# Patient Record
Sex: FEMALE | Race: ASIAN | NOT HISPANIC OR LATINO
[De-identification: names, ages, dates, MRNs, and addresses within clinical notes are randomized per-mention and may not be internally consistent; named-entity substitution may affect disease eponyms.]

---

## 2023-08-22 PROBLEM — Z00.00 ENCOUNTER FOR PREVENTIVE HEALTH EXAMINATION: Status: ACTIVE | Noted: 2023-08-22

## 2023-08-23 ENCOUNTER — APPOINTMENT (OUTPATIENT)
Dept: PAIN MANAGEMENT | Facility: CLINIC | Age: 63
End: 2023-08-23
Payer: MEDICAID

## 2023-08-23 VITALS — WEIGHT: 160 LBS | BODY MASS INDEX: 28.35 KG/M2 | HEIGHT: 63 IN

## 2023-08-23 VITALS — SYSTOLIC BLOOD PRESSURE: 124 MMHG | DIASTOLIC BLOOD PRESSURE: 83 MMHG | HEART RATE: 77 BPM

## 2023-08-23 DIAGNOSIS — Z87.39 PERSONAL HISTORY OF OTHER DISEASES OF THE MUSCULOSKELETAL SYSTEM AND CONNECTIVE TISSUE: ICD-10-CM

## 2023-08-23 DIAGNOSIS — M25.511 PAIN IN RIGHT SHOULDER: ICD-10-CM

## 2023-08-23 DIAGNOSIS — M54.16 RADICULOPATHY, LUMBAR REGION: ICD-10-CM

## 2023-08-23 PROCEDURE — 99204 OFFICE O/P NEW MOD 45 MIN: CPT

## 2023-08-23 RX ORDER — GABAPENTIN 300 MG/1
300 CAPSULE ORAL 3 TIMES DAILY
Qty: 90 | Refills: 1 | Status: ACTIVE | COMMUNITY
Start: 2023-08-23 | End: 1900-01-01

## 2023-08-23 RX ORDER — IBUPROFEN 400 MG/1
400 TABLET, FILM COATED ORAL
Refills: 0 | Status: ACTIVE | COMMUNITY

## 2023-08-23 NOTE — HISTORY OF PRESENT ILLNESS
[FreeTextEntry1] : This is a 62-year-old female here to establish care for lower back pain with radicular features into the lower extremities. She has secondary pain in the right shoulder that occasionally travels down the right arm. There is also pain with ROM. Her back pain has been ongoing for several years and during a flare up, the pain is severe and makes it difficult for her to ambulate or perform her ALDs. She recently experienced two flare ups of pain. She notes that in 2016 she underwent injection intervention, with a pain management provider in [facility], which provided her with moderate relief. There is no recent imaging for review today and she not recently trialed a physical therapy program. Previous therapy sessions provided her with no relief of her symptoms. She is interested in repeating injections but is aware that imaging is needed for review in order to move forward.   The patient has had this pain for 6 years worsening within the last 10 days. The pain started after no event patient describes pain as moderate to severe and intermittent. During the last month the pain has been worse during the night. Pain described as sharp. Pain is increased with lying down, standing, walking, exercise. Pain is not changed with sitting, relaxation, coughing/wheezing, bowel movements. Bowel or bladder habits have not changed.  ACTIVITIES: Patient could walk 3-4 blocks before the pain starts. Patient can sit 3 hours before pain starts. Patient can stand 2-hour before pain starts. The patient often lays down because of pain. Patient uses no assistive walking at this time. Patient has difficulty exercise at this time.  PRIOR PAIN TREATMENTS: Moderate relief with heat treatment. No relief with physical therapy.  PRIOR PAIN MEDICATIONS: Motrin.

## 2023-08-23 NOTE — ASSESSMENT
[FreeTextEntry1] : This is a 62-year-old female with complaints of low back pain with radicular features into the right lower extremity.  She has secondary pain in the right shoulder that occasionally travels down into the right arm.  Patient previously trialed a physical therapy program for her lumbar radicular pain which provided her no relief.  Previous injection treatments proved successful and she is interested in repeating them.  We will obtain a MRI of the lumbar spine and an x-ray of the right shoulder for further review.  In the interim she is advised to be in a physical therapy program for the right shoulder pain. She will follow up in 3 weeks to review imaging and reassessment. All this patients questions were answered and the conversation was understood well.  Lumbar MRI was ordered to delineate spine pathology such as disc displacement and stenosis.  Will order aright shoulder 2 view x-ray due to pain and decrease in range of motion in that area to delineate a pain generator.  Physical therapy of the right shoulder 2-3 times a week for 4-8 weeks stressing a home exercise program of walking, shoulder griddle strengthening, swimming, elliptical , recumbent bike, Virgil chi and Yoga. Use things that heat like hot shower or icy heat before rehab, exercising and at the beginning of the day, and ice (ice in a bag never directly on the skin) after activity and at the end of the day.  I, Deisi Marcelo, attest that this documentation has been prepared under the direction and in the presence of Provider Gabriela Shah MD.   Thank you for allowing me to assist in the management of this patient.    Best Regards,    Gabriela Shah M.D., FAAPMR   Diplomate, American Board of Physical Medicine and Rehabilitation Diplomate, American Board of Pain Medicine  Diplomate, American Board of Pain Management

## 2023-08-23 NOTE — DATA REVIEWED
[FreeTextEntry1] : SOAPP: low risk 8/23/23 Low risk: Patient has combination of a low risk SOAP and no risk factors. UDS would be repeated randomly every quarter.  UDS: No data obtained today.  [place] REGISTRY: Checked.

## 2023-09-12 ENCOUNTER — APPOINTMENT (OUTPATIENT)
Dept: MRI IMAGING | Facility: CLINIC | Age: 63
End: 2023-09-12

## 2023-09-15 ENCOUNTER — APPOINTMENT (OUTPATIENT)
Dept: PAIN MANAGEMENT | Facility: CLINIC | Age: 63
End: 2023-09-15

## 2023-09-15 ENCOUNTER — NON-APPOINTMENT (OUTPATIENT)
Age: 63
End: 2023-09-15